# Patient Record
(demographics unavailable — no encounter records)

---

## 2025-05-28 NOTE — ASSESSMENT
[FreeTextEntry1] : 68M PMH HTN (not taking meds), 9/11 exposure, CNS lymphoma s/p chemo/RT (last) follows at [facility], prostate ca s/p RT, chronic LE wounds, presents for worsening LLE exertional pain as well as exertional CP and SOB. He states gained 100lb weight gain over the past 1.5 years after period of immobility following a hip replacement. He has had these exertional symptoms for several months, but has noticed more recently progression, where he will have symptoms after walking about 15 feet. He does not take any prescribed medications at home (prescribed lisinopril which he has not been taking).  TCM NP reviewed that pt is under the [facility] program for home care until pt is seen by PCP. TCM NP will be assigned to sign Home Care orders post-discharge

## 2025-05-28 NOTE — REVIEW OF SYSTEMS
[Fever] : no fever [Chills] : no chills [Fatigue] : no fatigue [Vision Problems] : no vision problems [Hearing Loss] : no hearing loss [Chest Pain] : no chest pain [Shortness Of Breath] : no shortness of breath [Cough] : no cough [Abdominal Pain] : no abdominal pain [Nausea] : no nausea [Constipation] : no constipation [Vomiting] : no vomiting [Incontinence] : no incontinence [Itching] : no itching [Skin Rash] : no skin rash [Headache] : no headache [Dizziness] : no dizziness [Insomnia] : no insomnia

## 2025-05-28 NOTE — PLAN
[FreeTextEntry1] : 1. continue all medications as prescribed 2. maintain low salt diet 3. keep f/u with cardiology next week 4. schedule follow up with PCP 5. cont daily weights - call CARDS/PCP for gain of 2lbs in 2 dys or 5lbs in a week

## 2025-05-28 NOTE — HISTORY OF PRESENT ILLNESS
[Home] : at home, [unfilled] , at the time of the visit. [Other Location: e.g. Home (Enter Location, City,State)___] : at [unfilled] [Telephone (audio)] : This telephonic visit was provided via audio only technology. [Patient preference] : patient preference. [Verbal consent obtained from patient] : the patient, [unfilled] [FreeTextEntry1] : F/u post hospitalization at [facility] from 5/18-23 for chest pain, sob, hip pain [de-identified] : This patient is Enrolled in the Post-Discharge [facility] Follow Up Program through [facility] for Continuity of Care S/P Recent Hospitalization until seen by PCP.  Brief Hospital Course copied: 68M PMH HTN (not taking meds), 9/11 exposure, CNS lymphoma s/p chemo/RT (last ) follows at [facility], prostate ca s/p RT, chronic LE wounds, presents for worsening LLE exertional pain as well as exertional CP and SOB. He states lb weight gain over the past 1.5 years after period of immobility following a hip replacement. He has had these exertional symptoms for several months, but has noticed more recently progression, where he will have symptoms after walking about 15 feet. He does not take any prescribed medications at home. He was admitted for hypertensive urgency and several other health issues. His high blood pressure improved with new medications, including an ARB and hydralazine. (Prescribed lisinopril which he has not been taking it.)  Telephone visit made with pt. He is alert and oriented x 3. He denies CP, SOB, has BLE edema Instructed pt on daily weights to call his PCP/CARD if he gains 2 lb in 2 dys or 5 lb in a week, to maintain a low salt diet. Pt c/o he has lt hip pain s/p previous hip surgery and has osteonecrosis. Pt states he will be following up with ORTHO as recommended. He ambulates with a walker. Pt is able to speak in full sentences. Pt reports he has all medication is taking them. Pt states he will be following up with his cardiologist next week and will be making an appt with his PCP.

## 2025-05-30 NOTE — HEALTH RISK ASSESSMENT
[Very Good] : ~his/her~  mood as very good [No falls in past year] : Patient reported no falls in the past year [0] : 2) Feeling down, depressed, or hopeless: Not at all (0) [PHQ-2 Negative - No further assessment needed] : PHQ-2 Negative - No further assessment needed [NO] : No [HIV Test offered] : HIV Test offered [Hepatitis C test declined] : Hepatitis C test declined [None] : None [Retired] : retired [High School] : high school [Feels Safe at Home] : Feels safe at home [Fully functional (bathing, dressing, toileting, transferring, walking, feeding)] : Fully functional (bathing, dressing, toileting, transferring, walking, feeding) [Fully functional (using the telephone, shopping, preparing meals, housekeeping, doing laundry, using] : Fully functional and needs no help or supervision to perform IADLs (using the telephone, shopping, preparing meals, housekeeping, doing laundry, using transportation, managing medications and managing finances) [Reports normal functional visual acuity (ie: able to read med bottle)] : Reports normal functional visual acuity [Smoke Detector] : smoke detector [Carbon Monoxide Detector] : carbon monoxide detector [Safety elements used in home] : safety elements used in home [Seat Belt] :  uses seat belt [de-identified] : sedentary [[facility]] : 12 [[id number]] : 0 [Change in mental status noted] : No change in mental status noted [Language] : denies difficulty with language [Behavior] : denies difficulty with behavior [Learning/Retaining New Information] : denies difficulty learning/retaining new information [Handling Complex Tasks] : denies difficulty handling complex tasks [Reasoning] : denies difficulty with reasoning [Spatial Ability and Orientation] : denies difficulty with spatial ability and orientation [Sexually Active] : not sexually active [High Risk Behavior] : no high risk behavior [Reports changes in hearing] : Reports no changes in hearing [Reports changes in vision] : Reports no changes in vision [Reports changes in dental health] : Reports no changes in dental health [Sunscreen] : does not use sunscreen [Travel to Developing Areas] : does not  travel to developing areas [TB Exposure] : is not being exposed to tuberculosis [Caregiver Concerns] : does not have caregiver concerns [HepatitisCDate] : 03/24

## 2025-05-30 NOTE — PHYSICAL EXAM
[Normal] : affect was normal and insight and judgment were intact [de-identified] : Massively obese [de-identified] : Severe pain w WB, using a SPC. Has walker at home.

## 2025-05-30 NOTE — ASSESSMENT
[Vaccines Reviewed] : Immunizations reviewed today. Please see immunization details in the vaccine log within the immunization flowsheet.  [FreeTextEntry1] : *Morbid obesity. STOPBANG 7.  BMI 48,3 years ago 35.No discussion of witnessed sleep apnea in the hospital, to ask brother. Low thresh for HSAT. Eval for tirzepatide contraindications, though pt medication huong, on rtn, needs to lose substantial weight given CV disease.  *Chronic lower extremity venous stasis dermatitis secondary to valvular venous insufficiency, ruled out for lower extremity arterial insufficiency, DVT 2025.  Has been seen in wound care, remote ablation, plan was for Varithena foam injection to the greater saphenous vein apparently not covered by insurance. Offered ref back to wound care, prefers to do "ozone therapy."  *Coronary disease, medically managed. Agatston 285,  LM 92. LVEF 60-65% w/o LVH valvopathy or RWMAs, indet DD.Risk factor untreated hypertension, dyslipidemia, not elevated LP(a) not diabetes not smoker. *Peripheral arterial disease status post right carotid enterectomy  2015.   Bblkr ARB loop spiron ECASA Statin. Sees Dr Samuel next week.  US both carotids. No AAA on CT A/P 2025. Det ischemic burden to det if revasc necessary before orthopedic intervention pBNP in 2 weeks.   *Hypertension. Loop ARB spironolactone (coregUpdate BMP in 2 weeks, including Mg.  *Hyperlipidemia. , Lp a 70 pre treatment. ALT Lipids 2 months.  *CNS lymphoma, radiation therapy chemotherapy last treatment 2020, followed at [facility].  *Osteonecrosis left hip. s/p R THR. Using gait assist device, no recent falls. Surgical risk need be clarified. Start process by ref to Orthopedics. Small amt ultram given.  *Prostate cancer s/p XB radiation therapy last treatment 2021, followed at [facility]. Does not know if PSA undetectable. Check PSA.  *Medication noncompliance.  *Alcoholism, currenly in remission. Explore depression on rtn.  *Routine adult health maintenance Vaccinations: Tdap>10yrs. Declines Prevnar 20. Reviewed shingles presentation, risk, management.  Colon cancer screening: No FHx CRC. Colonoscopy #2 over 10 yrs ago. Denies ever polyps. Stabilize other issues, cologuard an option. Prostate cancer surveillance: See above HCV neg 3/2024. Update HIV. Update TSH vit D w next draw. (A1c lipids done inpt 5/2025). .  It was a pleasure to visit with Mr. Temple today.  Answered all questions as best I could. Disposition follow-up after labs, Dr. Alvarez may wish to add some, carotid ultrasound, in about 3 weeks. Time 45 minutes

## 2025-05-30 NOTE — HISTORY OF PRESENT ILLNESS
[FreeTextEntry1] : est care [de-identified] : Cookie 68-year-old white male with history of Progressive obesity BMI currently 48, Chronic venous stasis dermatitis Complicated by Ulceration and cellulitis, hypertension, hyperlipidemia, treated prostate cancer and CNS lymphoma, status post right Carotid endarterectomy 2015, Hospitalized 5/18-5/23 [facility] for hypertensive urgency noncompliant with outpatient lisinopril vs Entresto presenting as CP, SoB, Watkins 15' and hip pain.  R/O'd for ACS though heavy calcium burden on CT Agatston 285, pBNP 574 trop x2 ~ 110, on presentation. No LA nitrates on dismissal. TTE 60-65% LVEF indet DD no RWMAs or valvopathy. Bblkr arb maddi loop statin aspirin recommended, sees Dr Samuel/Card next week.  Ruled out for pulmonary embolism with CT PE protocol which showed no acute infiltrate or embolism. Watkins and SoB improved w BP control.  For infected leg, no wound cx but nasal swab MSSA+ MRSA -, and blood cx in ?1 bottle grew out Moraxella.treated with iv abx discharged on cefuroxime x 5 days. Leg art and iain US, xrays excluded PAD DVT osteomyelitis. For LEFT hip pain, plain films & CT A/P showed femoral head osteonecrosis with cortical collapse. Pending Card eval of ischemic burdnen/need for revascularization, pt tells me  has no appt with ortho. Saw ortho few years ago at [facility], radiology studies apparently inconclusive  Historically, med noncompliance has limited ability to improve pt's quality of life. Last seen in wound care 7/2024, plan for Varithena foam injn evidently hampered by insurance. Prior vein ablation. Doing "ozone therapy" past few months.  Chart ref to alcohol consumption, pt indicates 3-4 vodkas daily till 2 months ago. Father was alcoholic (WW2 Vet) Denies withdrawal seizure DT hx, or DUIs.  Pt lives at home with his brother. Does not drive.  Discharge Medications  aspirin 81 mg oral tablet, chewable: 1 tab(s) orally once a day atorvastatin 80 mg oral tablet: 1 tab(s) orally once a day (at bedtime) carvedilol 6.25 mg oral tablet: 1 tab(s) orally every 12 hours cefuroxime 500 mg oral tablet: 1 tab(s) orally 2 times a day losartan 25 mg oral tablet: 3 tab(s) orally once a day Multiple Vitamins oral tablet: 1 tab(s) orally once a day spironolactone 25 mg oral tablet: 1 tab(s) orally once a day torsemide 20 mg oral tablet: 2 tab(s) orally once a day Turmeric Tablet: 1 tablet orally once a day Vitamin C Tablet: 1 tablet orally once a day Vitamin D Tablet: 1 tablet orally once a day

## 2025-06-08 NOTE — HISTORY OF PRESENT ILLNESS
[FreeTextEntry1] : Mr. ALANIS is a 68 year male presenting for evaluation of ***   Notable PMHx: - CAD - HFpEF - HTN - RLE Venous Insufficiency  - CNS Lymphoma s/p chemoradiation in 2020 - Prostate CA s/p RT - Obesity Class III   - Arrhythmias - Valvular disease - HTN - HLD - DM2 - PAD - Stroke - Obesity - ASHLEE - Smoking - Prior pregnancies   HPI: ***   HTN (not taking meds), 9/11 exposure, CNS lymphoma s/p chemo/RT (last ~2020) follows at [facility], prostate ca s/p RT, chronic LE wounds, presents for worsening LLE exertional pain as well as exertional CP and SOB. He states ~100 lb weight gain over the past 1.5 years after period of immobility following a hip replacement. He has had these exertional symptoms for several months, but has noticed more recently progression, where he will have symptoms after walking about 15 feet. He does not take any prescribed medications at home (prescribed lisinopril which he has not been taking). (18 May 2025 12:58)  Functional Status: *** *** Denies chest pain, palpitations, light-headedness/dizziness, syncope, orthopnea, PND, ankle swelling, calf claudication, cough, fever/chills, snoring, apneic episodes       Social Hx: - tobacco use: - alcohol use: - recreational drug use: - occupation: - lives with:   Data Review: Labs - *** EKG - *** Echo - *** Stress - *** Cardiac CT - *** Cardiac MRI - *** Holter - *** Cath - *** Other - ***   ------------------------------ ***   # HFpEF # HTN - c/w torsemide 40mg BID - c/w losartan 25mg qd - c/w spironolactone 25mg qd  # CAD # HLD - c/w ASA 81mg qd - c/w atorvastatin 80mg qd  # HTN - c/w losartan 25mg qd   RTC in *** or sooner PRN

## 2025-06-13 NOTE — PHYSICAL EXAM
[TextEntry] : General: NAD Cardiac: nl s1s2, RRR, no mrg, no JVD, no peripheral edema Lungs: CTAB. Normal respiratory effort Vascular: lower extremities warm/well perfused Neuro: moves all extremities Psych: normal affect, normal mood

## 2025-06-13 NOTE — HISTORY OF PRESENT ILLNESS
[FreeTextEntry1] : Mr. ALANIS is a 68 year male presenting for evaluation of HFpEF and CAD  Notable PMHx: - CAD - HFpEF - HTN - RLE Venous Insufficiency - CNS Lymphoma s/p chemoradiation in 2020 (follows at [facility]) - Prostate CA s/p RT - Obesity Class III  HPI: Here to establish care after admission for chest pain, dyspnea, orthopnea worsening over several weeks 2/2 HFpEF exacerbation. He had >100 lbs weight gain in 1-2 years after immobility following hip replacement. He has been tolerating cardiac medications without issue. Creatinine has increased since discharge to 1.3. He has modified his diet and eating lean protein and eating plenty vegetables. He is tryin  EKG: SR. PRWP.  Data Review: Echo - 5/2025 TTE: LVEF 60-65%, no rwma, normal RV, elevated filling pressure, no sig valvular disease,  Stress -  Cardiac CT - 5/2025:  Limited diagnostic study due to above limitation. Large amount of predominantly calcified plaque resulting <50% stenosis of the LM and mild stenosis (25-49%) of the mid to distal RCA. Cannot exclude obstructive disease of the LAD and LCX due to dense calcification. Total Agatston score is 285 AU Cardiac MRI -  Holter -  Cath -  Other -

## 2025-06-13 NOTE — DISCUSSION/SUMMARY
[EKG obtained to assist in diagnosis and management of assessed problem(s)] : EKG obtained to assist in diagnosis and management of assessed problem(s) [FreeTextEntry1] : Here for follow-up for HFpEF and CAD. Given creatine rise and euvolemia on exam with improved dietary compliance, will decrease torsemide to 20mg qd. 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   # HFpEF # HTN - DECR torsemide from 20mg BID to 20mg qd  - c/w losartan 25mg qd - c/w spironolactone 25mg qd - c/w coreg 6.25mg BID - will consider SGLT2i once renal function returns to baseline - avoiding NSAIDs  # Excessive Daytime Fatigue # Obesity Class III - home sleep study  # CAD # HLD - c/w ASA 81mg qd - c/w atorvastatin 80mg qd - normal lpa - will discuss non-ischemic evaluation given CTCA at follow-up visit, no angina or anginal equivalents  # Left Hip Pain - orthopedic referral, names/numbers provided   RTC in 3 months or sooner PRN C/C Dr. Claude Bridges (PCP)  ====  Documentation for Sleep Study Ordering:Reason for Study: Suspected obstructive sleep apnea due to symptoms of excessive daytime sleepiness and comorbid CHF, Morbid Obesity, and HTN Device Provision: The patient was provided with a Watchpat sleep study device on 3/24/25 for home use to record heart rate, oxygen saturation, respiratory analysis, and total sleep time. Education and Instructions: The patient was educated on the proper use of the device, including how to attach the sensors on the patient's finger, wristband, and chest lead, start the recording, and troubleshoot common issues. Written instructions were also provided for reference. Patient will call if they have any questions.

## 2025-06-30 NOTE — DISCUSSION/SUMMARY
[EKG obtained to assist in diagnosis and management of assessed problem(s)] : EKG obtained to assist in diagnosis and management of assessed problem(s) [FreeTextEntry1] : Here for follow-up for HFpEF and CAD as well as perioperative cardiac risk stratification prior to left hip replacement with Dr. Savannah Majano on 7/9/25. He has been doing well since last visit and tolerating decrease in torsemide without issue. He is able to tolerate > 4 METs and is without chest pain or dyspnea on exertion. However, BMP today shows LUCERO which I suspect is related to excessive meds in the setting of significantly improved diet at home.  Will decrease losartan to 25mg qd, decrease torsemide to every other day, and stop spironolactone with plan to repeat BMP in 1 week. If improving, will be at acceptable cardiac risk for surgery. RN to call with medication plan and need for repeat labs in 1 week.  # Perioperative Cardiac Risk Stratification - med adjustment as above and repeat BMP - careful attention to fluid status and IVF given HFpEF and chronic diuretic use   # HFpEF # HTN - losartan, torsemide, and spironolactone as above - c/w coreg 6.25mg BID - avoiding NSAIDs  # CAD # HLD - c/w ASA 81mg qd - c/w atorvastatin 80mg qd  # Excessive Daytime Fatigue # Obesity Class III - watchpat results pending, pt having technical issues   RTC in 1-2 months post surgery or sooner PRN C/C: Dr. Savannah Majano (ortho) and Dr. Claude Bridges (PCP)

## 2025-06-30 NOTE — HISTORY OF PRESENT ILLNESS
[FreeTextEntry1] : Mr. ALANIS is a 68 year male presenting for evaluation of HFpEF and CAD  Notable PMHx: - CAD - HFpEF - HTN - RLE Venous Insufficiency - CNS Lymphoma s/p chemoradiation in 2020 (follows at [facility]) - Prostate CA s/p RT - Obesity Class III  HPI: Since last visit, seen by ortho and recommended for left hip replacement. Here for periop cardiac eval. Decreased torsemide at last visit due to euvolemia on exam and rising Cr. He has been working on diet closely and has had significant weight loss in the last couple weeks. He completed his home sleep study with results pending. Mild improvement in leg swelling since last vist. No chest pain or dyspnea at rest or with exertion. He is able to go up 2 flights of stairs without symptoms of chest pain or dyspnea   6/13/25 Here to establish care after admission for chest pain, dyspnea, orthopnea worsening over several weeks 2/2 HFpEF exacerbation. He had >100 lbs weight gain in 1-2 years after immobility following hip replacement. He has been tolerating cardiac medications without issue. Creatinine has increased since discharge to 1.3. He has modified his diet and eating lean protein and eating plenty vegetables. He is tryin  EKG: SR. PRWP.  Data Review: Echo - 5/2025 TTE: LVEF 60-65%, no rwma, normal RV, elevated filling pressure, no sig valvular disease,  Stress -  Cardiac CT - 5/2025:  Limited diagnostic study due to above limitation. Large amount of predominantly calcified plaque resulting <50% stenosis of the LM and mild stenosis (25-49%) of the mid to distal RCA.  Cardiac MRI -  Holter -  Cath -  Other -   ====